# Patient Record
Sex: FEMALE | Race: BLACK OR AFRICAN AMERICAN | NOT HISPANIC OR LATINO | URBAN - METROPOLITAN AREA
[De-identification: names, ages, dates, MRNs, and addresses within clinical notes are randomized per-mention and may not be internally consistent; named-entity substitution may affect disease eponyms.]

---

## 2021-04-13 ENCOUNTER — EMERGENCY (EMERGENCY)
Facility: HOSPITAL | Age: 31
LOS: 0 days | Discharge: HOME | End: 2021-04-14
Attending: EMERGENCY MEDICINE | Admitting: EMERGENCY MEDICINE
Payer: MEDICAID

## 2021-04-13 VITALS
HEART RATE: 91 BPM | OXYGEN SATURATION: 100 % | SYSTOLIC BLOOD PRESSURE: 145 MMHG | RESPIRATION RATE: 17 BRPM | TEMPERATURE: 98 F | DIASTOLIC BLOOD PRESSURE: 73 MMHG

## 2021-04-13 DIAGNOSIS — Z20.2 CONTACT WITH AND (SUSPECTED) EXPOSURE TO INFECTIONS WITH A PREDOMINANTLY SEXUAL MODE OF TRANSMISSION: ICD-10-CM

## 2021-04-13 DIAGNOSIS — Z88.8 ALLERGY STATUS TO OTHER DRUGS, MEDICAMENTS AND BIOLOGICAL SUBSTANCES STATUS: ICD-10-CM

## 2021-04-13 PROCEDURE — 99283 EMERGENCY DEPT VISIT LOW MDM: CPT

## 2021-04-13 RX ORDER — AZITHROMYCIN 500 MG/1
1 TABLET, FILM COATED ORAL ONCE
Refills: 0 | Status: DISCONTINUED | OUTPATIENT
Start: 2021-04-13 | End: 2021-04-13

## 2021-04-13 RX ORDER — CEFTRIAXONE 500 MG/1
500 INJECTION, POWDER, FOR SOLUTION INTRAMUSCULAR; INTRAVENOUS ONCE
Refills: 0 | Status: DISCONTINUED | OUTPATIENT
Start: 2021-04-13 | End: 2021-04-13

## 2021-04-13 NOTE — ED PROVIDER NOTE - CLINICAL SUMMARY MEDICAL DECISION MAKING FREE TEXT BOX
Healthy 29 yo F here for testing for STI -- patient is sexually active with 1 male partner, she is concerned that he is cheating on her and is requesting testing.  She has no pain, fever, discharge, dysuria.     VS normal,. Has clear lungs, RRR, soft, NT, ND abdomen, refused pelvic exam.    UCG negative, gonorrhea, chlamydia, syphilis and HIV testing sent. Patient declined ppx treatment.    Will dc home with return precautions.

## 2021-04-13 NOTE — ED PROVIDER NOTE - OBJECTIVE STATEMENT
31 yo female, no pmh, presents to ed for sti check. pt states had sexual intercourse unprotected a few weeks ago, no sxs, denies pain/ radiation, denies pelvic pain, dc, vaginal bleeding, external lesions.

## 2021-04-13 NOTE — ED PROVIDER NOTE - NS ED ROS FT
Constitutional: (-) fever, (-) chills  Integumentary: (-) rash, (-) edema, (-) bruises  :  (-)dysuria,  (-) hematuria  Allergic/Immunologic: (-) pruritus

## 2021-04-13 NOTE — ED PROVIDER NOTE - PHYSICAL EXAMINATION
Physical Exam    Vital Signs: I have reviewed the initial vital signs.  Constitutional: well-nourished, appears stated age, no acute distress  Eyes: Conjunctiva pink, Sclera clear  Gastrointestinal: soft, non-tender abdomen, no pulsatile mass, normal bowl sounds  Integumentary: warm, dry, no rash  Neurologic: awake, alert, nvi

## 2021-04-13 NOTE — ED PROVIDER NOTE - PATIENT PORTAL LINK FT
You can access the FollowMyHealth Patient Portal offered by [facility] by registering at the following website: [email or web address]. By joining [facility]’s FollowMyHealth portal, you will also be able to view your health information using other applications (apps) compatible with our system.

## 2021-04-14 LAB
APPEARANCE UR: ABNORMAL
BACTERIA # UR AUTO: NEGATIVE — SIGNIFICANT CHANGE UP
BILIRUB UR-MCNC: NEGATIVE — SIGNIFICANT CHANGE UP
C TRACH RRNA SPEC QL NAA+PROBE: SIGNIFICANT CHANGE UP
COLOR SPEC: YELLOW — SIGNIFICANT CHANGE UP
DIFF PNL FLD: NEGATIVE — SIGNIFICANT CHANGE UP
EPI CELLS # UR: 7 /HPF — HIGH (ref 0–5)
GLUCOSE UR QL: NEGATIVE — SIGNIFICANT CHANGE UP
HIV 1 & 2 AB SERPL IA.RAPID: SIGNIFICANT CHANGE UP
HYALINE CASTS # UR AUTO: 3 /LPF — SIGNIFICANT CHANGE UP (ref 0–7)
KETONES UR-MCNC: SIGNIFICANT CHANGE UP
LEUKOCYTE ESTERASE UR-ACNC: NEGATIVE — SIGNIFICANT CHANGE UP
N GONORRHOEA RRNA SPEC QL NAA+PROBE: SIGNIFICANT CHANGE UP
NITRITE UR-MCNC: NEGATIVE — SIGNIFICANT CHANGE UP
PH UR: 6 — SIGNIFICANT CHANGE UP (ref 5–8)
PROT UR-MCNC: ABNORMAL
RBC CASTS # UR COMP ASSIST: 7 /HPF — HIGH (ref 0–4)
SP GR SPEC: 1.03 — HIGH (ref 1.01–1.03)
SPECIMEN SOURCE: SIGNIFICANT CHANGE UP
T PALLIDUM AB TITR SER: NEGATIVE — SIGNIFICANT CHANGE UP
UROBILINOGEN FLD QL: ABNORMAL
WBC UR QL: 4 /HPF — SIGNIFICANT CHANGE UP (ref 0–5)

## 2021-12-22 ENCOUNTER — OUTPATIENT (OUTPATIENT)
Dept: OUTPATIENT SERVICES | Facility: HOSPITAL | Age: 31
LOS: 1 days | Discharge: HOME | End: 2021-12-22
Payer: MEDICAID

## 2021-12-22 VITALS
TEMPERATURE: 98 F | HEART RATE: 96 BPM | SYSTOLIC BLOOD PRESSURE: 123 MMHG | RESPIRATION RATE: 20 BRPM | DIASTOLIC BLOOD PRESSURE: 76 MMHG

## 2021-12-22 VITALS — DIASTOLIC BLOOD PRESSURE: 81 MMHG | SYSTOLIC BLOOD PRESSURE: 127 MMHG | HEART RATE: 88 BPM

## 2021-12-22 DIAGNOSIS — O26.892 OTHER SPECIFIED PREGNANCY RELATED CONDITIONS, SECOND TRIMESTER: ICD-10-CM

## 2021-12-22 DIAGNOSIS — R10.0 ACUTE ABDOMEN: ICD-10-CM

## 2021-12-22 LAB
APPEARANCE UR: CLEAR — SIGNIFICANT CHANGE UP
BACTERIA # UR AUTO: NEGATIVE — SIGNIFICANT CHANGE UP
BILIRUB UR-MCNC: NEGATIVE — SIGNIFICANT CHANGE UP
COLOR SPEC: YELLOW — SIGNIFICANT CHANGE UP
DIFF PNL FLD: NEGATIVE — SIGNIFICANT CHANGE UP
EPI CELLS # UR: 16 /HPF — HIGH (ref 0–5)
GLUCOSE UR QL: NEGATIVE — SIGNIFICANT CHANGE UP
HYALINE CASTS # UR AUTO: 4 /LPF — SIGNIFICANT CHANGE UP (ref 0–7)
KETONES UR-MCNC: NEGATIVE — SIGNIFICANT CHANGE UP
LEUKOCYTE ESTERASE UR-ACNC: ABNORMAL
NITRITE UR-MCNC: NEGATIVE — SIGNIFICANT CHANGE UP
PH UR: 6 — SIGNIFICANT CHANGE UP (ref 5–8)
PROT UR-MCNC: SIGNIFICANT CHANGE UP
RBC CASTS # UR COMP ASSIST: 2 /HPF — SIGNIFICANT CHANGE UP (ref 0–4)
SP GR SPEC: 1.02 — SIGNIFICANT CHANGE UP (ref 1.01–1.03)
UROBILINOGEN FLD QL: SIGNIFICANT CHANGE UP
WBC UR QL: 7 /HPF — HIGH (ref 0–5)

## 2021-12-22 PROCEDURE — 99282 EMERGENCY DEPT VISIT SF MDM: CPT | Mod: 25

## 2021-12-22 PROCEDURE — 76815 OB US LIMITED FETUS(S): CPT | Mod: 26

## 2021-12-22 RX ORDER — ACETAMINOPHEN 500 MG
975 TABLET ORAL ONCE
Refills: 0 | Status: DISCONTINUED | OUTPATIENT
Start: 2021-12-22 | End: 2022-01-05

## 2021-12-22 NOTE — OB RN TRIAGE NOTE - NS_FETALMOVEMENT_OBGYN_ALL_OB
Your symptoms are consistent with vocal cord dysfunction. I have recommended evaluation at the [facility].   You may call [phone] if you decide to schedule this appointment.  OK to continue to use the Albuterol HFA inhaler as needed if it seems to be helping you.   No follow up in pulmonary clinic is needed unless symptoms worsen or don't improve.  
Present, unchanged

## 2021-12-22 NOTE — OB PROVIDER TRIAGE NOTE - HISTORY OF PRESENT ILLNESS
32yo  @22w4d, CRISTAL 21, dated by LMP and first trimester sono (per pt), presents for lower abdominal cramping since yesterday AM, felt infrequently every 1-2 hours, rated 5 out of 10 in intensity. Denies vaginal bleeding, leakage of fluid. Reports good fetal movement. Reports increased blood pressures during this pregnancy, likely chronic hypertensive. Currently on nifedipine 60mg BID that started 2 weeks ago (increased from 30mg BID due to severe BPs). BPs at home are now 130-140s/70s. Has mild unilateral headache. Denies vision changes, RUQ/epigastric pain, increased abdominal pain. Currently on ASA 81mg daily. Denies fever, chills, nausea, vomiting. Denies dysuria, hematuria, increased urgency or frequency. Last meal last night (beef tory, fries), last BM this AM (denies diarrhea, constipation), last sexual intercourse 5 days ago. GBS unknown.

## 2021-12-22 NOTE — OB PROVIDER TRIAGE NOTE - ATTENDING COMMENTS
30 y/o P1 @ 22w4d with cramping. Patient was to be evaluated for  labor but she eloped prior to completion of monitoring. She states she will follow up.

## 2021-12-22 NOTE — OB PROVIDER TRIAGE NOTE - NS_OBGYNHISTORY_OBGYN_ALL_OB_FT
No OB Hx:    FT LTCSx1, for NRFHR, 7-5  etopx1, D&C    Gyn Hx:  Denies h/o ovarian cysts, fibroids, STIs, or abnormal pap smears.

## 2021-12-22 NOTE — OB PROVIDER TRIAGE NOTE - NSHPPHYSICALEXAM_GEN_ALL_CORE
Vital Signs Last 24 Hrs  T(C): 36.6 (22 Dec 2021 10:35), Max: 36.6 (22 Dec 2021 10:35)  T(F): 97.9 (22 Dec 2021 10:35), Max: 97.9 (22 Dec 2021 10:35)  HR: 96 (22 Dec 2021 10:35) (96 - 96)  BP: 123/76 (22 Dec 2021 10:35) (123/76 - 123/76)  RR: 20 (22 Dec 2021 10:35) (20 - 20)    Gen: AOx3, NAD  CV: normal s1, s2  Pulm: CTAB  Abd: soft, gravid, nontender, no palpable contractions  Ext: no swelling  Speculum: white discharge noted in vagina, no vaginal bleeding  Bedside sono: CL 3.5cm, cephalic, MVP 7cm, gross fetal movements noted, FHR 155bpm  [place]: uterine irritability   SVE: 0/0/-3

## 2021-12-22 NOTE — OB RN TRIAGE NOTE - FALL HARM RISK - HARM RISK INTERVENTIONS

## 2021-12-22 NOTE — OB PROVIDER TRIAGE NOTE - NSOBPROVIDERNOTE_OBGYN_ALL_OB_FT
32yo  @22w4d, CRISTAL 21, GBS unknown, with cramping, for evaluation to r/o  labor    - f/u UA, UCx  - fu vaginitis  - continuous toco  - acetaminophen 975mg PO  - Discussed with  and     ADDENDUM:  Patient eloped prior to receiving tylenol because she had to  her children. Patient states she will return.  Will f/u UA, Ucx, vaginitis.     and  aware. 30yo  @22w4d, CRISTAL 21, GBS unknown, with cramping, for evaluation to r/o  labor    - f/u UA, UCx  - fu vaginitis  - continuous toco  - acetaminophen 975mg PO  - Discussed with  and Dr. Cheung    ADDENDUM:  Patient eloped prior to receiving tylenol because she had to  her children. Patient states she will return.  Will f/u UA, Ucx, vaginitis.     and  aware.

## 2021-12-24 LAB
CULTURE RESULTS: SIGNIFICANT CHANGE UP
SPECIMEN SOURCE: SIGNIFICANT CHANGE UP

## 2021-12-28 LAB
A VAGINAE DNA VAG QL NAA+PROBE: SIGNIFICANT CHANGE UP
BVAB2 DNA VAG QL NAA+PROBE: SIGNIFICANT CHANGE UP
C ALBICANS DNA VAG QL NAA+PROBE: NEGATIVE — SIGNIFICANT CHANGE UP
C GLABRATA DNA VAG QL NAA+PROBE: NEGATIVE — SIGNIFICANT CHANGE UP
C KRUSEI DNA VAG QL NAA+PROBE: NEGATIVE — SIGNIFICANT CHANGE UP
C LUSITANIAE DNA VAG QL NAA+PROBE: NEGATIVE — SIGNIFICANT CHANGE UP
C TRACH RRNA SPEC QL NAA+PROBE: NEGATIVE — SIGNIFICANT CHANGE UP
MEGA1 DNA VAG QL NAA+PROBE: SIGNIFICANT CHANGE UP
N GONORRHOEA RRNA SPEC QL NAA+PROBE: NEGATIVE — SIGNIFICANT CHANGE UP
T VAGINALIS RRNA SPEC QL NAA+PROBE: NEGATIVE — SIGNIFICANT CHANGE UP

## 2022-05-26 ENCOUNTER — EMERGENCY (EMERGENCY)
Facility: HOSPITAL | Age: 32
LOS: 0 days | Discharge: HOME | End: 2022-05-26
Attending: EMERGENCY MEDICINE | Admitting: EMERGENCY MEDICINE
Payer: MEDICAID

## 2022-05-26 VITALS
SYSTOLIC BLOOD PRESSURE: 153 MMHG | HEART RATE: 77 BPM | TEMPERATURE: 98 F | WEIGHT: 225.09 LBS | RESPIRATION RATE: 18 BRPM | DIASTOLIC BLOOD PRESSURE: 90 MMHG | OXYGEN SATURATION: 99 %

## 2022-05-26 DIAGNOSIS — K02.9 DENTAL CARIES, UNSPECIFIED: ICD-10-CM

## 2022-05-26 DIAGNOSIS — I10 ESSENTIAL (PRIMARY) HYPERTENSION: ICD-10-CM

## 2022-05-26 DIAGNOSIS — K08.89 OTHER SPECIFIED DISORDERS OF TEETH AND SUPPORTING STRUCTURES: ICD-10-CM

## 2022-05-26 DIAGNOSIS — Z88.8 ALLERGY STATUS TO OTHER DRUGS, MEDICAMENTS AND BIOLOGICAL SUBSTANCES: ICD-10-CM

## 2022-05-26 PROCEDURE — 99284 EMERGENCY DEPT VISIT MOD MDM: CPT

## 2022-05-26 NOTE — ED PROVIDER NOTE - NSFOLLOWUPINSTRUCTIONS_ED_ALL_ED_FT
Dental Caries    Dental caries (also called tooth decay) is the most common oral disease. It can occur at any age but is more common in children and young adults.     HOW DENTAL CARIES DEVELOPS  The process of decay begins when bacteria and foods (particularly sugars and starches) combine in your mouth to produce plaque. Plaque is a substance that sticks to the hard, outer surface of a tooth (enamel). The bacteria in plaque produce acids that attack enamel. These acids may also attack the root surface of a tooth (cementum) if it is exposed. Repeated attacks dissolve these surfaces and create holes in the tooth (cavities). If left untreated, the acids destroy the other layers of the tooth.     RISK FACTORS  Frequent sipping of sugary beverages.   Frequent snacking on sugary and starchy foods, especially those that easily get stuck in the teeth.   Poor oral hygiene.   Dry mouth.   Substance abuse such as methamphetamine abuse.   Broken or poor-fitting dental restorations.   Eating disorders.   Gastroesophageal reflux disease (GERD).   Certain radiation treatments to the head and neck.     SYMPTOMS  In the early stages of dental caries, symptoms are seldom present. Sometimes white, chalky areas may be seen on the enamel or other tooth layers. In later stages, symptoms may include:    Pits and holes on the enamel.  Toothache after sweet, hot, or cold foods or drinks are consumed.  Pain around the tooth.  Swelling around the tooth.     DIAGNOSIS  Most of the time, dental caries is detected during a regular dental checkup. A diagnosis is made after a thorough medical and dental history is taken and the surfaces of your teeth are checked for signs of dental caries. Sometimes special instruments, such as lasers, are used to check for dental caries. Dental X-ray exams may be taken so that areas not visible to the eye (such as between the contact areas of the teeth) can be checked for cavities.     TREATMENT  If dental caries is in its early stages, it may be reversed with a fluoride treatment or an application of a remineralizing agent at the dental office. Thorough brushing and flossing at home is needed to aid these treatments. If it is in its later stages, treatment depends on the location and extent of tooth destruction:     If a small area of the tooth has been destroyed, the destroyed area will be removed and cavities will be filled with a material such as gold, silver amalgam, or composite resin.   If a large area of the tooth has been destroyed, the destroyed area will be removed and a cap (crown) will be fitted over the remaining tooth structure.   If the center part of the tooth (pulp) is affected, a procedure called a root canal will be needed before a filling or crown can be placed.   If most of the tooth has been destroyed, the tooth may need to be pulled (extracted).     HOME CARE INSTRUCTIONS  You can prevent, stop, or reverse dental caries at home by practicing good oral hygiene. Good oral hygiene includes:     Thoroughly cleaning your teeth at least twice a day with a toothbrush and dental floss.   Using a fluoride toothpaste. A fluoride mouth rinse may also be used if recommended by your dentist or health care provider.   Restricting the amount of sugary and starchy foods and sugary liquids you consume.   Avoiding frequent snacking on these foods and sipping of these liquids.   Keeping regular visits with a dentist for checkups and cleanings.     PREVENTION  Practice good oral hygiene.  Consider a dental sealant. A dental sealant is a coating material that is applied by your dentist to the pits and grooves of teeth. The sealant prevents food from being trapped in them. It may protect the teeth for several years.  Ask about fluoride supplements if you live in a community without fluorinated water or with water that has a low fluoride content. Use fluoride supplements as directed by your dentist or health care provider.  Allow fluoride varnish applications to teeth if directed by your dentist or health care provider.     ADDITIONAL NOTES AND INSTRUCTIONS    Please follow up with your Primary MD in 24-48 hr.  Seek immediate medical care for any new/worsening signs or symptoms.

## 2022-05-26 NOTE — ED PROVIDER NOTE - PHYSICAL EXAMINATION
VITAL SIGNS: I have reviewed nursing notes and confirm.  CONSTITUTIONAL: Well-appearing, non-toxic, in NAD  SKIN: Warm dry, normal skin turgor  HEAD: NCAT  EYES: No conjunctival injection, scleral anicteric  ENT: Moist mucous membranes, normal pharynx with no erythema or exudates. Multiple dental caries. Severely decayed tooth 30 without any periodontal abscesses. No floor of mouth tenderness. No neck swelling.   NECK: Supple; full ROM.   NEURO: CN grossly intact. Normal gait.  PSYCH: Cooperative, appropriate.

## 2022-05-26 NOTE — ED PROVIDER NOTE - ATTENDING CONTRIBUTION TO CARE
31-year-old woman complains of 10 months of gradually worsening pain to the right lower jaw, now painful chewing.  No fever or chills, no facial swelling, no difficulty swallowing.  Vital signs, exam as noted, #30 grossly carious.  To dental clinic for eval.

## 2022-05-26 NOTE — ED PROVIDER NOTE - NS ED ROS FT
Constitutional:  No fever, chills,   ENMT: +Per HPI  Neuro:  No numbness, weakness, or tingling  Except as documented in the HPI,  all other systems are negative

## 2022-05-26 NOTE — CONSULT NOTE ADULT - SUBJECTIVE AND OBJECTIVE BOX
Patient is a 31y old  Female who presents with a chief complaint of experiencing severe toothache in lower right side    HPI:      PAST MEDICAL & SURGICAL HISTORY:  Hypertension       delivery delivered        (   ) heart valve replacement  (   ) joint replacement  (   ) pregnancy    MEDICATIONS  (STANDING):    MEDICATIONS  (PRN):      Allergies    ondansetron (Unknown)    Intolerances        FAMILY HISTORY:  FH: diabetes mellitus (Father, Mother)        *SOCIAL HISTORY: (   ) Tobacco; (   ) ETOH    *Last Dental Visit:    Vital Signs Last 24 Hrs  T(C): 36.9 (26 May 2022 12:11), Max: 36.9 (26 May 2022 12:11)  T(F): 98.4 (26 May 2022 12:11), Max: 98.4 (26 May 2022 12:11)  HR: 77 (26 May 2022 12:11) (77 - 77)  BP: 153/90 (26 May 2022 12:11) (153/90 - 153/90)  BP(mean): --  RR: 18 (26 May 2022 12:11) (18 - 18)  SpO2: 99% (26 May 2022 12:11) (99% - 99%)    LABS:                  EOE:  TMJ ( -  ) clicks                     ( -  ) pops                     ( -  ) crepitus             Mandible <<FROM>>             Facial bones and MOM <<grossly intact>>             ( -  ) trismus             ( -  ) lymphadenopathy             ( -  ) swelling             ( -  ) asymmetry             ( +  ) palpation             ( -  ) dyspnea             ( -  ) dysphagia             ( -  ) loss of consciousness    IOE:  <<permanent>> dentition:  <<multiple carious teeth>> and <<multiple missing teeth>>           hard/soft palate:  ( -  ) palatal torus, <<No pathology noted>>           tongue/FOM <<No pathology noted>>           labial/buccal mucosa <<No pathology noted>>           ( +  ) percussion           ( +  ) palpation           ( -  ) swelling            ( -  ) abscess           ( -  ) sinus tract    Dentition present: <<   >>  Mobility: <<  >>  Caries: <<   >>         *DENTAL RADIOGRAPHS: 1 periapical    RADIOLOGY & ADDITIONAL STUDIES:    *ASSESSMENT: Clinical exam #31 is grossly decayed. Sensitive to palpation and percussion. Tooth is restorable. Different treatment options including root canal/crown, extraction or no no treatment was given to th patient. Patient would like to extract the tooth.       *PLAN: Risks and benefits of #31 extraction was discussed as per OS sheet dated 00. Consent and side site signed. Anesthetized with 2 carpules of 2% Lidocaine, 1:100,000 Epinephrine via inferior alveolar nerve block and buccal in filtration and 1 carpule 4% Septocaine, 1:100,000 Epinephrine via infiltration in the area of #31. The tooth was extracted non surgically without any complications. Curettaged and irrigated copiously with saline. Hemostasis was achieved. Post-op periapical radiograph was taken with negative findings. Post-op instructions were given verbally and written. Patient dismissed stable.         RECOMMENDATIONS:  1) <<Over the counter medications for pain management  2- No spitting and smoking  3- Rinsing with saline 12 hours post extraction  4- Soft diet >>  2) Dental F/U with outpatient dentist for comprehensive dental care.   3) If any difficulty swallowing/breathing, fever occur, return to ER.     Nathaly Davis DMD pager #[phone]

## 2022-05-26 NOTE — ED PROVIDER NOTE - OBJECTIVE STATEMENT
31nc PMHx presenting with 10 months gradually worsening "achy" dental pain to the right lower jaw, recently limiting her ability to chew hard foods on the affected side. No recent F/C, no other mouth or throat pain.

## 2023-05-22 ENCOUNTER — OUTPATIENT (OUTPATIENT)
Dept: OUTPATIENT SERVICES | Facility: HOSPITAL | Age: 33
LOS: 1 days | End: 2023-05-22
Payer: COMMERCIAL

## 2023-05-22 DIAGNOSIS — K02.9 DENTAL CARIES, UNSPECIFIED: ICD-10-CM

## 2023-05-22 PROCEDURE — D0230: CPT

## 2023-05-22 PROCEDURE — D0330: CPT

## 2023-05-31 DIAGNOSIS — K02.63 DENTAL CARIES ON SMOOTH SURFACE PENETRATING INTO PULP: ICD-10-CM

## 2023-06-02 ENCOUNTER — OUTPATIENT (OUTPATIENT)
Dept: OUTPATIENT SERVICES | Facility: HOSPITAL | Age: 33
LOS: 1 days | End: 2023-06-02
Payer: COMMERCIAL

## 2023-06-02 DIAGNOSIS — K01.1 IMPACTED TEETH: ICD-10-CM

## 2023-06-02 PROCEDURE — D7140: CPT

## 2023-06-05 DIAGNOSIS — K02.63 DENTAL CARIES ON SMOOTH SURFACE PENETRATING INTO PULP: ICD-10-CM
